# Patient Record
Sex: FEMALE | Race: WHITE
[De-identification: names, ages, dates, MRNs, and addresses within clinical notes are randomized per-mention and may not be internally consistent; named-entity substitution may affect disease eponyms.]

---

## 2020-07-02 ENCOUNTER — HOSPITAL ENCOUNTER (OUTPATIENT)
Dept: HOSPITAL 47 - RADUSWWP | Age: 55
Discharge: HOME | End: 2020-07-02
Attending: INTERNAL MEDICINE
Payer: MEDICARE

## 2020-07-02 DIAGNOSIS — D72.819: ICD-10-CM

## 2020-07-02 DIAGNOSIS — K76.89: Primary | ICD-10-CM

## 2020-07-02 DIAGNOSIS — R10.10: ICD-10-CM

## 2020-07-02 LAB — BUN SERPL-SCNC: 14 MG/DL (ref 7–17)

## 2020-07-02 PROCEDURE — 82565 ASSAY OF CREATININE: CPT

## 2020-07-02 PROCEDURE — 84520 ASSAY OF UREA NITROGEN: CPT

## 2020-07-02 PROCEDURE — 76700 US EXAM ABDOM COMPLETE: CPT

## 2020-07-02 PROCEDURE — 36415 COLL VENOUS BLD VENIPUNCTURE: CPT

## 2020-07-02 NOTE — US
EXAMINATION TYPE: US abdomen complete

 

DATE OF EXAM: 7/2/2020

 

COMPARISON: NONE

 

CLINICAL HISTORY: 55-year-old female Abdominal Pain R10.10, D72.819 Leukopenia. Low WBC

 

TECHNIQUE: Multiple sonographic images of the thyroid gland are obtained.

 

EXAM MEASUREMENTS:

 

Liver Length:  12.7 cm   

Gallbladder Wall:  .2 cm   

CBD:  .4 cm

Spleen:  7.7 cm   

Right Kidney:  9.3 x 5.0 x 4.5  cm 

Left Kidney:  11.8 x 3.3 x 4.0  cm   

 

 

 

Pancreas:  Tail obscured by overlying bowel gas

Liver: Two cystic areas seen largest with septated area seen measuring 3.0 x 6.0 x 4.5 cm located in 
the right lobe    

Gallbladder:  wnl with fold

**Evidence for sonographic Garay's sign:  No

CBD:  wnl 

Spleen:  wnl   

Right Kidney:  wnl   

Left Kidney:  wnl   

Upper IVC:  wnl  

Abd Aorta:  wnl

 

 

 

IMPRESSION: 

1. Mildly complex right hepatic lobe cyst measuring 4.5 cm with internal septation. Consider a 6-12 m
[facility] follow-up exam to reassess as a precautionary measure.

2. No gallstones or biliary ductal dilatation.